# Patient Record
Sex: MALE | Race: WHITE | NOT HISPANIC OR LATINO
[De-identification: names, ages, dates, MRNs, and addresses within clinical notes are randomized per-mention and may not be internally consistent; named-entity substitution may affect disease eponyms.]

---

## 2018-11-24 ENCOUNTER — TRANSCRIPTION ENCOUNTER (OUTPATIENT)
Age: 63
End: 2018-11-24

## 2019-08-22 PROBLEM — Z00.00 ENCOUNTER FOR PREVENTIVE HEALTH EXAMINATION: Status: ACTIVE | Noted: 2019-08-22

## 2019-09-05 ENCOUNTER — OUTPATIENT (OUTPATIENT)
Dept: OUTPATIENT SERVICES | Facility: HOSPITAL | Age: 64
LOS: 1 days | Discharge: HOME | End: 2019-09-05

## 2019-09-05 ENCOUNTER — APPOINTMENT (OUTPATIENT)
Dept: CARDIOLOGY | Facility: CLINIC | Age: 64
End: 2019-09-05
Payer: COMMERCIAL

## 2019-09-05 ENCOUNTER — OTHER (OUTPATIENT)
Age: 64
End: 2019-09-05

## 2019-09-05 VITALS
HEIGHT: 71 IN | SYSTOLIC BLOOD PRESSURE: 120 MMHG | BODY MASS INDEX: 25.76 KG/M2 | DIASTOLIC BLOOD PRESSURE: 86 MMHG | WEIGHT: 184 LBS | HEART RATE: 70 BPM

## 2019-09-05 DIAGNOSIS — R00.2 PALPITATIONS: ICD-10-CM

## 2019-09-05 PROCEDURE — 99204 OFFICE O/P NEW MOD 45 MIN: CPT

## 2019-09-05 PROCEDURE — 93000 ELECTROCARDIOGRAM COMPLETE: CPT
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
## 2019-09-05 NOTE — REASON FOR VISIT
[Consultation] : a consultation regarding [Palpitations] : palpitations [FreeTextEntry1] : presyncope
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
## 2019-09-05 NOTE — PHYSICAL EXAM
[General Appearance - Well Developed] : well developed [Normal Appearance] : normal appearance [Well Groomed] : well groomed [General Appearance - Well Nourished] : well nourished [No Deformities] : no deformities [General Appearance - In No Acute Distress] : no acute distress [Normal Conjunctiva] : the conjunctiva exhibited no abnormalities [Eyelids - No Xanthelasma] : the eyelids demonstrated no xanthelasmas [Normal Oral Mucosa] : normal oral mucosa [No Oral Pallor] : no oral pallor [No Oral Cyanosis] : no oral cyanosis [Normal Rate] : normal [Rhythm Regular] : regular [No Murmur] : no murmurs heard [2+] : left 2+ [Respiration, Rhythm And Depth] : normal respiratory rhythm and effort [Exaggerated Use Of Accessory Muscles For Inspiration] : no accessory muscle use [Auscultation Breath Sounds / Voice Sounds] : lungs were clear to auscultation bilaterally [Abdomen Soft] : soft [Abdomen Tenderness] : non-tender [Abdomen Mass (___ Cm)] : no abdominal mass palpated [Abnormal Walk] : normal gait [Gait - Sufficient For Exercise Testing] : the gait was sufficient for exercise testing [Nail Clubbing] : no clubbing of the fingernails [Cyanosis, Localized] : no localized cyanosis [Petechial Hemorrhages (___cm)] : no petechial hemorrhages [Skin Color & Pigmentation] : normal skin color and pigmentation [] : no rash [No Venous Stasis] : no venous stasis [Skin Lesions] : no skin lesions [No Skin Ulcers] : no skin ulcer [No Xanthoma] : no  xanthoma was observed [Oriented To Time, Place, And Person] : oriented to person, place, and time [Affect] : the affect was normal [Mood] : the mood was normal [No Anxiety] : not feeling anxious [FreeTextEntry1] : No JVD No bruit

## 2019-09-05 NOTE — REVIEW OF SYSTEMS
[Eyeglasses] : currently wearing eyeglasses [Palpitations] : palpitations [Heartburn] : heartburn [Joint Pain] : joint pain [Joint Swelling] : joint swelling [Negative] : Heme/Lymph [Shortness Of Breath] : no shortness of breath [Chest Pain] : no chest pain [Abdominal Pain] : no abdominal pain [Nausea] : no nausea [Dysphagia] : no dysphagia

## 2019-09-08 LAB
T4 FREE SERPL-MCNC: 1.1 NG/DL
TSH SERPL-ACNC: 1.85 UIU/ML

## 2019-10-03 ENCOUNTER — APPOINTMENT (OUTPATIENT)
Dept: CARDIOLOGY | Facility: CLINIC | Age: 64
End: 2019-10-03
Payer: COMMERCIAL

## 2019-10-03 PROCEDURE — ZZZZZ: CPT

## 2019-10-09 ENCOUNTER — APPOINTMENT (OUTPATIENT)
Dept: CARDIOLOGY | Facility: CLINIC | Age: 64
End: 2019-10-09
Payer: COMMERCIAL

## 2019-10-09 VITALS
SYSTOLIC BLOOD PRESSURE: 128 MMHG | WEIGHT: 182 LBS | BODY MASS INDEX: 25.48 KG/M2 | DIASTOLIC BLOOD PRESSURE: 80 MMHG | HEIGHT: 71 IN | HEART RATE: 71 BPM

## 2019-10-09 DIAGNOSIS — Z78.9 OTHER SPECIFIED HEALTH STATUS: ICD-10-CM

## 2019-10-09 DIAGNOSIS — Z72.89 OTHER PROBLEMS RELATED TO LIFESTYLE: ICD-10-CM

## 2019-10-09 PROCEDURE — 99214 OFFICE O/P EST MOD 30 MIN: CPT

## 2019-10-09 PROCEDURE — 93000 ELECTROCARDIOGRAM COMPLETE: CPT

## 2019-10-09 NOTE — REASON FOR VISIT
[Atrial Fibrillation] : atrial fibrillation [Initial Evaluation] : an initial evaluation of [FreeTextEntry1] : Referring cardiologist: Dr. Mix \par  [Spouse] : spouse

## 2019-10-09 NOTE — HISTORY OF PRESENT ILLNESS
[FreeTextEntry1] : 64 yo M former  with recurrent palpitations and newly dx Afib on the cardiac telemetry monitor. He presents for initial consultation. \par \par Patient reports palpitations since Spring 2019. Since then he has had 3 more episodes of "fluttering" at times associated with near syncope symptoms lasting 15-20 minutes . He was seen by Dr. Mix, he was equipped with an event monitor , which recorded an episode of Paroxysmal Afib with RVR  , -160's on 10/6/2019 at 20:11 pm.  \par \par ECG ( 10/9/2019) - SR at 71 bpm, LAD, QTc 391 ms \par \par Social hx: non smoker, drinks 1-2 glasses /wine daily, no elicit drug use .

## 2019-10-09 NOTE — PHYSICAL EXAM
[General Appearance - Well Nourished] : well nourished [No Deformities] : no deformities [General Appearance - Well Developed] : well developed [Normal Conjunctiva] : the conjunctiva exhibited no abnormalities [Normal Oropharynx] : normal oropharynx [Normal Oral Mucosa] : normal oral mucosa [Heart Rate And Rhythm] : heart rate and rhythm were normal [Heart Sounds] : normal S1 and S2 [Respiration, Rhythm And Depth] : normal respiratory rhythm and effort [Auscultation Breath Sounds / Voice Sounds] : lungs were clear to auscultation bilaterally [Bowel Sounds] : normal bowel sounds [Abdomen Soft] : soft [Abnormal Walk] : normal gait [Nail Clubbing] : no clubbing of the fingernails [Cyanosis, Localized] : no localized cyanosis [Petechial Hemorrhages (___cm)] : no petechial hemorrhages [Nail Splinter Hemorrhages] : no splinter hemorrhages of the nails [Skin Color & Pigmentation] : normal skin color and pigmentation [] : no rash [Oriented To Time, Place, And Person] : oriented to person, place, and time [No Anxiety] : not feeling anxious [Normal Appearance] : normal appearance [Well Groomed] : well groomed [General Appearance - In No Acute Distress] : no acute distress [FreeTextEntry1] : No JVD [Exaggerated Use Of Accessory Muscles For Inspiration] : no accessory muscle use [Murmurs] : no murmurs present [Edema] : no peripheral edema present

## 2019-10-09 NOTE — END OF VISIT
[FreeTextEntry3] : I was present with the nurse practitioner during the history and exam of the patient. I discussed patient's management with the NP in detail. I reviewed the NP’s note and agree with the documented findings and plan of care. I would like to take this opportunity to thank you for involving me in this patient’s care. Please do not hesitate to contact me if you have any further questions at [phone].

## 2019-10-09 NOTE — ASSESSMENT
[FreeTextEntry1] : 64 y/o male with history of palpitations and newly diagnosed Symptomatic Paroxysmal Atrial Fibrillation noted on event monitor with CHADS - VASc is 0 . \par \par Patient was seen and examined with Dr. He:  Atrial Fibrillation management, stroke prevention,  rate control and rhythm control and  medical management with AADs therapy, vs AV femi blocking agents vs a catheter ablation options  discussed  in details with the patient and his wife. \par \par At this time we recommend start Metoprolol ER 25 mg daily for rate control.  I have educated the patient that BB may also lower their blood pressure and that they should monitor their BP at home and to call us and hold the medication if SBP is less than 90 mmHg. I have also informed the patient that this medication can cause fatigue, dizziness or lightheadedness and to watch for these symptoms.\par \par  His CHADS- VASc is 0 . Will add ASA 81 mg daily to his medication regimen . We advised to proceed with a Stress ECHO as scheduled on 11/6/2019 and to continue the event monitor, that he is wearing now for total of 4 weeks to eval AFib burden and rate control. We also referred him to pulmonary to r/o MARZENA . Recent TSH was normal. \par \par He will return for re-assessment in 4-6 weeks . He knows to contact the office if recurrent palpitations, or if he develops side effects from Metoprolol .  \par \par He verbalized full understanding of the discussion and all questions were answered.

## 2019-11-01 ENCOUNTER — APPOINTMENT (OUTPATIENT)
Dept: CARDIOLOGY | Facility: CLINIC | Age: 64
End: 2019-11-01
Payer: COMMERCIAL

## 2019-11-01 PROCEDURE — 93228 REMOTE 30 DAY ECG REV/REPORT: CPT

## 2019-11-06 ENCOUNTER — APPOINTMENT (OUTPATIENT)
Dept: CARDIOLOGY | Facility: CLINIC | Age: 64
End: 2019-11-06
Payer: COMMERCIAL

## 2019-11-06 PROCEDURE — 93351 STRESS TTE COMPLETE: CPT

## 2019-11-06 PROCEDURE — 93325 DOPPLER ECHO COLOR FLOW MAPG: CPT

## 2019-11-06 PROCEDURE — 93320 DOPPLER ECHO COMPLETE: CPT

## 2019-11-13 ENCOUNTER — APPOINTMENT (OUTPATIENT)
Dept: CARDIOLOGY | Facility: CLINIC | Age: 64
End: 2019-11-13
Payer: COMMERCIAL

## 2019-11-13 VITALS
SYSTOLIC BLOOD PRESSURE: 120 MMHG | DIASTOLIC BLOOD PRESSURE: 75 MMHG | HEART RATE: 60 BPM | HEIGHT: 71 IN | BODY MASS INDEX: 25.48 KG/M2 | WEIGHT: 182 LBS

## 2019-11-13 DIAGNOSIS — Z87.19 PERSONAL HISTORY OF OTHER DISEASES OF THE DIGESTIVE SYSTEM: ICD-10-CM

## 2019-11-13 PROCEDURE — 99215 OFFICE O/P EST HI 40 MIN: CPT

## 2019-11-13 PROCEDURE — 93000 ELECTROCARDIOGRAM COMPLETE: CPT

## 2019-11-13 NOTE — CARDIOLOGY SUMMARY
[___] : [unfilled] [No Ischemia] : no Ischemia [No Exercise Ind Arr] : no exercise induced arrhythmias [___] : [unfilled]

## 2019-11-13 NOTE — PHYSICAL EXAM
[General Appearance - Well Developed] : well developed [Normal Appearance] : normal appearance [Well Groomed] : well groomed [General Appearance - Well Nourished] : well nourished [No Deformities] : no deformities [General Appearance - In No Acute Distress] : no acute distress [Normal Conjunctiva] : the conjunctiva exhibited no abnormalities [Normal Oral Mucosa] : normal oral mucosa [Normal Oropharynx] : normal oropharynx [Respiration, Rhythm And Depth] : normal respiratory rhythm and effort [Exaggerated Use Of Accessory Muscles For Inspiration] : no accessory muscle use [Auscultation Breath Sounds / Voice Sounds] : lungs were clear to auscultation bilaterally [Heart Rate And Rhythm] : heart rate and rhythm were normal [Murmurs] : no murmurs present [Heart Sounds] : normal S1 and S2 [Edema] : no peripheral edema present [Bowel Sounds] : normal bowel sounds [Abdomen Soft] : soft [Abnormal Walk] : normal gait [Nail Clubbing] : no clubbing of the fingernails [Cyanosis, Localized] : no localized cyanosis [Petechial Hemorrhages (___cm)] : no petechial hemorrhages [Nail Splinter Hemorrhages] : no splinter hemorrhages of the nails [Skin Color & Pigmentation] : normal skin color and pigmentation [] : no rash [Oriented To Time, Place, And Person] : oriented to person, place, and time [No Anxiety] : not feeling anxious [FreeTextEntry1] : No JVD

## 2019-11-13 NOTE — REASON FOR VISIT
[Initial Evaluation] : an initial evaluation of [Atrial Fibrillation] : atrial fibrillation [Spouse] : spouse [FreeTextEntry1] : \par

## 2019-11-13 NOTE — HISTORY OF PRESENT ILLNESS
[FreeTextEntry1] : \par Referring cardiologist: Dr. Mix \par \par 64 yo M former  with recurrent palpitations and newly dx Afib on the cardiac telemetry monitor. He presents for follow up after was started with metoprolol\par \par Patient reports palpitations since Spring 2019. Since then he has had 3 more episodes of "fluttering" at times associated with near syncope symptoms lasting 15-20 minutes . He was seen by Dr. Mix, he was equipped with an event monitor , which recorded an episode of Paroxysmal Afib with RVR  , -160's on 10/6/2019 at 20:11 pm.  He feels better since being on BB. He still notes occasional but very brief palpitations. \par

## 2019-11-13 NOTE — END OF VISIT
[FreeTextEntry3] : I was present with the nurse practitioner during the history and exam of the patient. I discussed patient's management with the NP in detail. I reviewed the NP’s note and agree with the documented findings and plan of care. I would like to take this opportunity to thank you for involving me in this patient’s care. Please do not hesitate to contact me if you have any further questions at [phone].\par

## 2019-11-14 ENCOUNTER — OUTPATIENT (OUTPATIENT)
Dept: OUTPATIENT SERVICES | Facility: HOSPITAL | Age: 64
LOS: 1 days | Discharge: HOME | End: 2019-11-14

## 2019-11-15 DIAGNOSIS — G47.33 OBSTRUCTIVE SLEEP APNEA (ADULT) (PEDIATRIC): ICD-10-CM

## 2019-11-27 ENCOUNTER — APPOINTMENT (OUTPATIENT)
Dept: CARDIOLOGY | Facility: CLINIC | Age: 64
End: 2019-11-27
Payer: COMMERCIAL

## 2019-11-27 VITALS
HEART RATE: 60 BPM | HEIGHT: 71 IN | BODY MASS INDEX: 26.04 KG/M2 | DIASTOLIC BLOOD PRESSURE: 80 MMHG | SYSTOLIC BLOOD PRESSURE: 118 MMHG | WEIGHT: 186 LBS

## 2019-11-27 PROCEDURE — 93000 ELECTROCARDIOGRAM COMPLETE: CPT

## 2019-11-27 PROCEDURE — 99214 OFFICE O/P EST MOD 30 MIN: CPT

## 2019-11-27 NOTE — PHYSICAL EXAM
[General Appearance - Well Developed] : well developed [Normal Appearance] : normal appearance [Well Groomed] : well groomed [General Appearance - Well Nourished] : well nourished [No Deformities] : no deformities [General Appearance - In No Acute Distress] : no acute distress [Normal Conjunctiva] : the conjunctiva exhibited no abnormalities [Eyelids - No Xanthelasma] : the eyelids demonstrated no xanthelasmas [Normal Oral Mucosa] : normal oral mucosa [No Oral Pallor] : no oral pallor [No Oral Cyanosis] : no oral cyanosis [Respiration, Rhythm And Depth] : normal respiratory rhythm and effort [Exaggerated Use Of Accessory Muscles For Inspiration] : no accessory muscle use [Auscultation Breath Sounds / Voice Sounds] : lungs were clear to auscultation bilaterally [Abdomen Soft] : soft [Abdomen Tenderness] : non-tender [Abdomen Mass (___ Cm)] : no abdominal mass palpated [Abnormal Walk] : normal gait [Gait - Sufficient For Exercise Testing] : the gait was sufficient for exercise testing [Nail Clubbing] : no clubbing of the fingernails [Cyanosis, Localized] : no localized cyanosis [Petechial Hemorrhages (___cm)] : no petechial hemorrhages [Skin Color & Pigmentation] : normal skin color and pigmentation [] : no rash [No Venous Stasis] : no venous stasis [Skin Lesions] : no skin lesions [No Skin Ulcers] : no skin ulcer [No Xanthoma] : no  xanthoma was observed [Oriented To Time, Place, And Person] : oriented to person, place, and time [Affect] : the affect was normal [Mood] : the mood was normal [No Anxiety] : not feeling anxious [Normal Rate] : normal [Rhythm Regular] : regular [No Murmur] : no murmurs heard [2+] : left 2+ [FreeTextEntry1] : No JVD No bruit

## 2019-11-27 NOTE — ASSESSMENT
[FreeTextEntry1] : PAF, episodes are shorter and less symptomatic with beta blocker. He is CHADS vasc is 0 and he is on ASA . ETT echo was negative for ischemia at moderate exercise load .

## 2019-11-27 NOTE — HISTORY OF PRESENT ILLNESS
[FreeTextEntry1] : The patient was found to have PAF with RVR . The patient was also found to have severe MARZENA . The patient is thought to be CHADSvasc 0 and is treated with ASA and beta blocker. This has improved his symptoms . The patient's last episode was tow weeks ago and was brief  . The patient has not had chest pain . ETT echo was negative for ischemia .

## 2020-01-07 ENCOUNTER — RX RENEWAL (OUTPATIENT)
Age: 65
End: 2020-01-07

## 2020-03-10 ENCOUNTER — APPOINTMENT (OUTPATIENT)
Dept: UROLOGY | Facility: CLINIC | Age: 65
End: 2020-03-10
Payer: MEDICARE

## 2020-03-10 VITALS — BODY MASS INDEX: 25.9 KG/M2 | HEIGHT: 71 IN | WEIGHT: 185 LBS

## 2020-03-10 DIAGNOSIS — N40.1 BENIGN PROSTATIC HYPERPLASIA WITH LOWER URINARY TRACT SYMPMS: ICD-10-CM

## 2020-03-10 DIAGNOSIS — N13.8 BENIGN PROSTATIC HYPERPLASIA WITH LOWER URINARY TRACT SYMPMS: ICD-10-CM

## 2020-03-10 PROCEDURE — 99203 OFFICE O/P NEW LOW 30 MIN: CPT

## 2020-03-10 NOTE — PHYSICAL EXAM
[General Appearance - In No Acute Distress] : no acute distress [Edema] : no peripheral edema [] : no respiratory distress [Bowel Sounds] : normal bowel sounds [Abdomen Hernia] : no hernia was discovered [Urethral Meatus] : meatus normal [Scrotum] : the scrotum was normal [Epididymis] : the epididymides were normal [Testes Tenderness] : no tenderness of the testes [Testes Mass (___cm)] : there were no testicular masses [Prostate Tenderness] : the prostate was not tender [No Prostate Nodules] : no prostate nodules [Prostate Size ___ (0-4)] : prostate size [unfilled] (scale: 0-4) [FreeTextEntry1] : Minimal dorsal Peyronie's plaque

## 2020-03-10 NOTE — REVIEW OF SYSTEMS
[Fever] : no fever [Feeling Poorly] : not feeling poorly [Chest Pain] : no chest pain [Cough] : no cough [Abdominal Pain] : no abdominal pain [Dysuria] : no dysuria [Confused] : no confusion

## 2020-03-10 NOTE — ASSESSMENT
[FreeTextEntry1] : Likely resolved right inguinal/scrotal soft tissue injury.\par \par BPH with urinary symptoms requires no treatment\par \par Early Peyronie's disease. Recommend vitamin E 400 international units 3 times a day [Peyronie's Disease (607.85\N48.6)] : complete
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
## 2020-03-27 ENCOUNTER — APPOINTMENT (OUTPATIENT)
Dept: CARDIOLOGY | Facility: CLINIC | Age: 65
End: 2020-03-27

## 2020-03-30 ENCOUNTER — RX RENEWAL (OUTPATIENT)
Age: 65
End: 2020-03-30

## 2020-04-15 ENCOUNTER — APPOINTMENT (OUTPATIENT)
Dept: CARDIOLOGY | Facility: CLINIC | Age: 65
End: 2020-04-15
Payer: MEDICARE

## 2020-04-15 VITALS — BODY MASS INDEX: 25.9 KG/M2 | HEIGHT: 71 IN | WEIGHT: 185 LBS

## 2020-04-15 DIAGNOSIS — N40.0 BENIGN PROSTATIC HYPERPLASIA WITHOUT LOWER URINARY TRACT SYMPMS: ICD-10-CM

## 2020-04-15 PROCEDURE — 99214 OFFICE O/P EST MOD 30 MIN: CPT | Mod: 95

## 2020-04-15 NOTE — PHYSICAL EXAM
[General Appearance - Well Developed] : well developed [Normal Appearance] : normal appearance [Well Groomed] : well groomed [General Appearance - Well Nourished] : well nourished [General Appearance - In No Acute Distress] : no acute distress [Normal Conjunctiva] : the conjunctiva exhibited no abnormalities [Eyelids - No Xanthelasma] : the eyelids demonstrated no xanthelasmas [Normal Oral Mucosa] : normal oral mucosa [No Oral Pallor] : no oral pallor [No Oral Cyanosis] : no oral cyanosis [Respiration, Rhythm And Depth] : normal respiratory rhythm and effort [Exaggerated Use Of Accessory Muscles For Inspiration] : no accessory muscle use [Abdomen Mass (___ Cm)] : no abdominal mass palpated [Abnormal Walk] : normal gait [Nail Clubbing] : no clubbing of the fingernails [Cyanosis, Localized] : no localized cyanosis [Petechial Hemorrhages (___cm)] : no petechial hemorrhages [Skin Color & Pigmentation] : normal skin color and pigmentation [] : no rash [No Venous Stasis] : no venous stasis [Skin Lesions] : no skin lesions [No Skin Ulcers] : no skin ulcer [No Xanthoma] : no  xanthoma was observed [Oriented To Time, Place, And Person] : oriented to person, place, and time [Affect] : the affect was normal [Mood] : the mood was normal [No Anxiety] : not feeling anxious [No Murmur] : no murmurs heard [2+] : left 2+ [No Pitting Edema] : no pitting edema present [Rt] : varicose veins of the right leg noted [Lt] : varicose veins of the left leg noted [FreeTextEntry1] : No JVD

## 2020-04-15 NOTE — HISTORY OF PRESENT ILLNESS
[Home] : at home, [unfilled] , at the time of the visit. [Other Location: e.g. Home (Enter Location, City,State)___] : at [unfilled] [Patient] : the patient [FreeTextEntry1] : The patient has not had clinical AF . Occasional fleeting extrasystole . Using CPAP but not able to make it through the night with his CPAP macine. . He does not have HTN or DM and is now CHADSvasc 1  in view of age .

## 2020-04-15 NOTE — ASSESSMENT
[FreeTextEntry1] : The patient has had no further episodes of AF . He has MARZENA which may be driving his symptoms , He is using CPAP but is having difficulty using it all night , He is know CHADSvasc 1 because of age .

## 2020-04-16 ENCOUNTER — TRANSCRIPTION ENCOUNTER (OUTPATIENT)
Age: 65
End: 2020-04-16

## 2020-05-19 VITALS — BODY MASS INDEX: 25.2 KG/M2 | HEIGHT: 71 IN | WEIGHT: 180 LBS

## 2020-05-19 RX ORDER — ASPIRIN 325 MG
81 TABLET ORAL
Qty: 90 | Refills: 1 | Status: COMPLETED | COMMUNITY
Start: 2020-03-30 | End: 2020-05-19

## 2020-05-26 ENCOUNTER — APPOINTMENT (OUTPATIENT)
Dept: CARDIOLOGY | Facility: CLINIC | Age: 65
End: 2020-05-26
Payer: MEDICARE

## 2020-05-26 VITALS
WEIGHT: 190 LBS | HEIGHT: 71 IN | HEART RATE: 54 BPM | SYSTOLIC BLOOD PRESSURE: 120 MMHG | BODY MASS INDEX: 26.6 KG/M2 | DIASTOLIC BLOOD PRESSURE: 80 MMHG

## 2020-05-26 PROCEDURE — 99213 OFFICE O/P EST LOW 20 MIN: CPT | Mod: 95

## 2020-05-26 NOTE — PHYSICAL EXAM
[Normal Appearance] : normal appearance [General Appearance - Well Developed] : well developed [General Appearance - In No Acute Distress] : no acute distress [Exaggerated Use Of Accessory Muscles For Inspiration] : no accessory muscle use [] : no respiratory distress [Oriented To Time, Place, And Person] : oriented to person, place, and time [No Anxiety] : not feeling anxious [FreeTextEntry1] : wear glasses

## 2020-05-26 NOTE — ASSESSMENT
[FreeTextEntry1] : 63 yo M with history of palpitations and symptomatic Paroxysmal Atrial Fibrillation noted on event monitor with CHADS - VASc 1 (Age) on aspirin. He has newly diagnosed MARZENA and is compliant with CPAP.\par \par In the past, we offered increasing his Metoprolol to 50 mg daily given one other episode of AFib with RVR, but pt states his symptoms have substantially improved and he would rather not try any increased medication doses or new medications at this time. Should his symptoms become more frequent, we can consider adding AAD or an ablation. \par \par \par He will return for re-assessment in 6 months. He knows to contact the office if recurrent palpitations. I have also advised the patient to go to the nearest emergency room if he experiences any chest pain, dyspnea, syncope, or has any other compelling symptoms.\par \par He verbalized full understanding of the discussion and all questions were answered.

## 2020-05-26 NOTE — HISTORY OF PRESENT ILLNESS
[FreeTextEntry1] : Mr. Jr Kaplan has given me verbal authorization to provide tele services. Verbal consent given on 05/26/2020  by the patient.\par \par This visit was provided via telehealth using real-time 2-way audio visual technology. The patient, Mr. Jr Kaplan, was located at home,  [address] [place] [address], at the time of the visit. \par \par The patient, Mr. Jr Kaplan, and Provider participated in the telehealth encounter.\par \par Referring cardiologist: Dr. Mix \par \par 63 yo M former  with recurrent palpitations and newly dx Afib on the cardiac telemetry monitor. He presents for routine follow up. He initially had palpitations since Spring 2019. Since then he had 3 more episodes of "fluttering" at times associated with near syncope symptoms lasting 15-20 minutes. He was seen by Dr. Mix, he was equipped with an event monitor, which recorded an episode of Paroxysmal AFib with RVR (-160's) on 10/6/2019 at 20:11 pm. He feels better since being on BB. He still notes occasional but very brief palpitations, now lasting only a few seconds. He feels better overall. \par \par He was also diagnosed with sleep apnea and started using CPAP. He feels this has helped decrease his palpitations.

## 2020-09-10 ENCOUNTER — APPOINTMENT (OUTPATIENT)
Dept: CARDIOLOGY | Facility: CLINIC | Age: 65
End: 2020-09-10
Payer: MEDICARE

## 2020-09-10 VITALS — WEIGHT: 186 LBS | HEIGHT: 71 IN | BODY MASS INDEX: 26.04 KG/M2

## 2020-09-10 VITALS — DIASTOLIC BLOOD PRESSURE: 80 MMHG | SYSTOLIC BLOOD PRESSURE: 110 MMHG | HEART RATE: 60 BPM

## 2020-09-10 PROCEDURE — 99214 OFFICE O/P EST MOD 30 MIN: CPT | Mod: 95

## 2020-09-10 NOTE — REVIEW OF SYSTEMS
[Eyeglasses] : currently wearing eyeglasses [Palpitations] : palpitations [Heartburn] : heartburn [Joint Swelling] : joint swelling [Joint Pain] : joint pain [Negative] : Heme/Lymph [Chest Pain] : no chest pain [Shortness Of Breath] : no shortness of breath [Dysphagia] : no dysphagia [Abdominal Pain] : no abdominal pain [Nausea] : no nausea

## 2020-09-10 NOTE — HISTORY OF PRESENT ILLNESS
[Home] : at home, [unfilled] , at the time of the visit. [Medical Office: ([facility])___] : at the medical office located in  [Patient] : the patient [FreeTextEntry1] : The patient has not had clinical AF . Occasional fleeting extrasystole . Using CPAP but not able to make it through the night with his CPAP macine. . He does not have HTN or DM and is now CHADSvasc 1  in view of age .

## 2020-09-10 NOTE — PHYSICAL EXAM
[Normal Appearance] : normal appearance [General Appearance - Well Developed] : well developed [General Appearance - Well Nourished] : well nourished [General Appearance - In No Acute Distress] : no acute distress [Normal Conjunctiva] : the conjunctiva exhibited no abnormalities [Well Groomed] : well groomed [Normal Oral Mucosa] : normal oral mucosa [Eyelids - No Xanthelasma] : the eyelids demonstrated no xanthelasmas [No Oral Pallor] : no oral pallor [No Oral Cyanosis] : no oral cyanosis [Respiration, Rhythm And Depth] : normal respiratory rhythm and effort [Abdomen Mass (___ Cm)] : no abdominal mass palpated [Exaggerated Use Of Accessory Muscles For Inspiration] : no accessory muscle use [Abnormal Walk] : normal gait [Cyanosis, Localized] : no localized cyanosis [Petechial Hemorrhages (___cm)] : no petechial hemorrhages [Nail Clubbing] : no clubbing of the fingernails [Skin Color & Pigmentation] : normal skin color and pigmentation [] : no rash [No Venous Stasis] : no venous stasis [Skin Lesions] : no skin lesions [No Skin Ulcers] : no skin ulcer [Oriented To Time, Place, And Person] : oriented to person, place, and time [No Xanthoma] : no  xanthoma was observed [Mood] : the mood was normal [No Anxiety] : not feeling anxious [Affect] : the affect was normal [No Murmur] : no murmurs heard [Rt] : varicose veins of the right leg noted [Lt] : varicose veins of the left leg noted [No Pitting Edema] : no pitting edema present [2+] : left 2+ [FreeTextEntry1] : No JVD

## 2020-09-10 NOTE — ASSESSMENT
[FreeTextEntry1] : The patent has had no further AF clinically . He is CHADSvasc 1 and is being treated with ASA . No chest pain or SOB . No syncope or presyncope .

## 2020-09-16 ENCOUNTER — APPOINTMENT (OUTPATIENT)
Dept: CARDIOLOGY | Facility: CLINIC | Age: 65
End: 2020-09-16

## 2020-09-29 ENCOUNTER — RX RENEWAL (OUTPATIENT)
Age: 65
End: 2020-09-29

## 2020-12-02 ENCOUNTER — APPOINTMENT (OUTPATIENT)
Dept: CARDIOLOGY | Facility: CLINIC | Age: 65
End: 2020-12-02
Payer: SELF-PAY

## 2020-12-02 VITALS
WEIGHT: 186 LBS | TEMPERATURE: 97.6 F | SYSTOLIC BLOOD PRESSURE: 138 MMHG | HEART RATE: 67 BPM | HEIGHT: 71 IN | DIASTOLIC BLOOD PRESSURE: 82 MMHG | BODY MASS INDEX: 26.04 KG/M2

## 2020-12-02 PROCEDURE — 99072 ADDL SUPL MATRL&STAF TM PHE: CPT

## 2020-12-02 PROCEDURE — 93000 ELECTROCARDIOGRAM COMPLETE: CPT

## 2020-12-02 PROCEDURE — 99214 OFFICE O/P EST MOD 30 MIN: CPT

## 2020-12-02 NOTE — HISTORY OF PRESENT ILLNESS
[FreeTextEntry1] : \par Referring cardiologist: Dr. Mix \par \par 65 yo M former  with recurrent palpitations and newly diagnosed AFib on the cardiac telemetry monitor. He presents for routine follow up. He initially had palpitations since Spring 2019. Since then he had 3 more episodes of "fluttering" at times associated with near syncope symptoms lasting 15-20 minutes. He was seen by Dr. Mix, he was equipped with an event monitor, which recorded an episode of Paroxysmal AFib with RVR (-160's) on 10/6/2019 at 20:11 pm. He feels better since being on BB. He notes very brief palpitations. They are so rare that he states "can't even remember the last one." He feels better overall. \par \par He was also diagnosed with sleep apnea and uses his CPAP every night.

## 2020-12-02 NOTE — ASSESSMENT
[FreeTextEntry1] : 65 yo M with history of palpitations and symptomatic paroxysmal Atrial Fibrillation noted on event monitor with CHADS - VASc 1 (Age) on aspirin. \par \par Paroxsymal AFib \par - On Aspirin for CHADS VASc 1 with no s/sx of bleeding.\par - Currently doing well and has less palpitations on Metoprolol Succinate 25mg. \par - Will consider ablation if symptoms worsen\par \par MARZENA\par - Compliant with CPAP\par \par Follow up in 9 months or sooner if sx occur. \par \par I have also advised the patient to go to the nearest emergency room if he experiences any chest pain, dyspnea, syncope, or has any other compelling symptoms.

## 2020-12-02 NOTE — PHYSICAL EXAM
[General Appearance - Well Developed] : well developed [Normal Appearance] : normal appearance [Well Groomed] : well groomed [General Appearance - Well Nourished] : well nourished [No Deformities] : no deformities [General Appearance - In No Acute Distress] : no acute distress [Normal Conjunctiva] : the conjunctiva exhibited no abnormalities [Eyelids - No Xanthelasma] : the eyelids demonstrated no xanthelasmas [] : no respiratory distress [Respiration, Rhythm And Depth] : normal respiratory rhythm and effort [Exaggerated Use Of Accessory Muscles For Inspiration] : no accessory muscle use [Auscultation Breath Sounds / Voice Sounds] : lungs were clear to auscultation bilaterally [Heart Rate And Rhythm] : heart rate and rhythm were normal [Heart Sounds] : normal S1 and S2 [Murmurs] : no murmurs present [Edema] : no peripheral edema present [Abdomen Soft] : soft [Abnormal Walk] : normal gait [Nail Clubbing] : no clubbing of the fingernails [Cyanosis, Localized] : no localized cyanosis [Skin Color & Pigmentation] : normal skin color and pigmentation [Oriented To Time, Place, And Person] : oriented to person, place, and time [FreeTextEntry1] : No JVD

## 2021-01-27 ENCOUNTER — APPOINTMENT (OUTPATIENT)
Dept: CARDIOLOGY | Facility: CLINIC | Age: 66
End: 2021-01-27
Payer: MEDICARE

## 2021-01-27 VITALS
DIASTOLIC BLOOD PRESSURE: 70 MMHG | WEIGHT: 190 LBS | TEMPERATURE: 97.4 F | HEIGHT: 71 IN | BODY MASS INDEX: 26.6 KG/M2 | HEART RATE: 65 BPM | SYSTOLIC BLOOD PRESSURE: 120 MMHG

## 2021-01-27 PROCEDURE — 99072 ADDL SUPL MATRL&STAF TM PHE: CPT

## 2021-01-27 PROCEDURE — 93000 ELECTROCARDIOGRAM COMPLETE: CPT

## 2021-01-27 PROCEDURE — 99214 OFFICE O/P EST MOD 30 MIN: CPT

## 2021-01-27 NOTE — PHYSICAL EXAM
[General Appearance - Well Developed] : well developed [Normal Appearance] : normal appearance [Well Groomed] : well groomed [General Appearance - Well Nourished] : well nourished [General Appearance - In No Acute Distress] : no acute distress [Normal Conjunctiva] : the conjunctiva exhibited no abnormalities [Eyelids - No Xanthelasma] : the eyelids demonstrated no xanthelasmas [No Oral Pallor] : no oral pallor [Normal Oral Mucosa] : normal oral mucosa [No Oral Cyanosis] : no oral cyanosis [Respiration, Rhythm And Depth] : normal respiratory rhythm and effort [Exaggerated Use Of Accessory Muscles For Inspiration] : no accessory muscle use [Abdomen Mass (___ Cm)] : no abdominal mass palpated [Abnormal Walk] : normal gait [Nail Clubbing] : no clubbing of the fingernails [Cyanosis, Localized] : no localized cyanosis [Petechial Hemorrhages (___cm)] : no petechial hemorrhages [Skin Color & Pigmentation] : normal skin color and pigmentation [] : no rash [No Venous Stasis] : no venous stasis [Skin Lesions] : no skin lesions [No Skin Ulcers] : no skin ulcer [No Xanthoma] : no  xanthoma was observed [Oriented To Time, Place, And Person] : oriented to person, place, and time [Affect] : the affect was normal [Mood] : the mood was normal [No Anxiety] : not feeling anxious [No Murmur] : no murmurs heard [2+] : left 2+ [No Pitting Edema] : no pitting edema present [Rt] : varicose veins of the right leg noted [Lt] : varicose veins of the left leg noted [FreeTextEntry1] : No JVD

## 2021-01-27 NOTE — HISTORY OF PRESENT ILLNESS
[FreeTextEntry1] : The patient has been feeling feeling well. He has not had palpitations except for occasional fleeting ep[isode more c/w extrasystole . He is CHADsvasc 1 and is onAS .

## 2021-01-27 NOTE — ASSESSMENT
[FreeTextEntry1] : The patent has had no further AF clinically . He is CHADSvasc 1 and is being treated with ASA . No chest pain or SOB . No syncope or presyncope .  The patient has had 2 CAC scores done . In 2008 he had a CAC score of 0 . In 2014 his CAC score was about 8 . LDL is not at goal.

## 2021-02-04 ENCOUNTER — APPOINTMENT (OUTPATIENT)
Dept: CARDIOLOGY | Facility: CLINIC | Age: 66
End: 2021-02-04

## 2021-05-04 ENCOUNTER — RX RENEWAL (OUTPATIENT)
Age: 66
End: 2021-05-04

## 2021-07-22 ENCOUNTER — APPOINTMENT (OUTPATIENT)
Dept: CARDIOLOGY | Facility: CLINIC | Age: 66
End: 2021-07-22
Payer: MEDICARE

## 2021-07-22 VITALS
BODY MASS INDEX: 25.76 KG/M2 | DIASTOLIC BLOOD PRESSURE: 82 MMHG | HEART RATE: 64 BPM | HEIGHT: 71 IN | TEMPERATURE: 96.7 F | SYSTOLIC BLOOD PRESSURE: 122 MMHG | WEIGHT: 184 LBS

## 2021-07-22 PROCEDURE — 93000 ELECTROCARDIOGRAM COMPLETE: CPT

## 2021-07-22 PROCEDURE — 99214 OFFICE O/P EST MOD 30 MIN: CPT

## 2021-07-22 NOTE — HISTORY OF PRESENT ILLNESS
[FreeTextEntry1] : The patient continues to feels well. No chest pain or SOB using his CPAP machine and is feeling well. The patient has not had chest pain or SOB

## 2021-07-22 NOTE — REASON FOR VISIT
[CV Risk Factors and Non-Cardiac Disease] : CV risk factors and non-cardiac disease [Arrhythmia/ECG Abnorrmalities] : arrhythmia/ECG abnormalities [Hyperlipidemia] : hyperlipidemia [Consultation] : a consultation regarding [Palpitations] : palpitations [FreeTextEntry3] : Dr. Aaron  [FreeTextEntry1] : presyncope

## 2021-07-22 NOTE — ASSESSMENT
[FreeTextEntry1] : The patent has had no further AF clinically . He is CHADSvasc 1 and is being treated with ASA . No chest pain or SOB . No syncope or presyncope .  The patient has had 2 CAC scores done . In 2008 he had a CAC score of 0 . In 2014 his CAC score was about 8 . LDL is now at goal. . For some reason his creatinine had increased from 4-2021 . The patient has been on Omeprazole for his Gupta's. Will repeat BMP

## 2021-08-09 ENCOUNTER — TRANSCRIPTION ENCOUNTER (OUTPATIENT)
Age: 66
End: 2021-08-09

## 2021-09-15 ENCOUNTER — APPOINTMENT (OUTPATIENT)
Dept: CARDIOLOGY | Facility: CLINIC | Age: 66
End: 2021-09-15
Payer: MEDICARE

## 2021-09-15 VITALS
BODY MASS INDEX: 25.9 KG/M2 | TEMPERATURE: 97.6 F | WEIGHT: 185 LBS | DIASTOLIC BLOOD PRESSURE: 85 MMHG | HEART RATE: 60 BPM | SYSTOLIC BLOOD PRESSURE: 127 MMHG | HEIGHT: 71 IN

## 2021-09-15 PROCEDURE — 93000 ELECTROCARDIOGRAM COMPLETE: CPT

## 2021-09-15 PROCEDURE — 99214 OFFICE O/P EST MOD 30 MIN: CPT

## 2021-09-15 NOTE — CARDIOLOGY SUMMARY
[___] : [unfilled] [de-identified] : 9/15/21 NSR (HR 60 bpm) [de-identified] : Stress ECHO 11/6/19, no Ischemia, no exercise induced arrhythmias, Luciano, 7:31, 89% MPHR; EF 55-65%.

## 2021-09-15 NOTE — ASSESSMENT
[FreeTextEntry1] : 66 yo M with history of palpitations and symptomatic paroxysmal Atrial Fibrillation noted on event monitor with CHADS - VASc 1 (Age) on aspirin. \par \par # Paroxysmal AFib \par - On Aspirin for CHADS VASc 1 (Age > 65) with no s/sx of bleeding. Hemoglobin stable. Labs from July reviewed. Cr 1.46\par - Currently doing well and has less palpitations on Metoprolol Succinate 25 mg. \par - Will consider ablation if symptoms worsen. We discussed ablation and AAD but pt would like to defer both for now. \par \par # MARZENA\par - Compliant with CPAP\par - Pulm referral to follow up re CPAP settings\par \par Follow up in 6-9 months or sooner if sx occur. \par \par I have also advised the patient to go to the nearest emergency room if he experiences any chest pain, dyspnea, syncope, or has any other compelling symptoms.

## 2021-09-15 NOTE — DISCUSSION/SUMMARY
[FreeTextEntry1] : We had an extensive conversation regarding the nature of atrial fibrillation, including potential etiologies, underlying pathophysiology and natural history of the disease. In addition, the potential risk of thromboembolic events and assessment of that risk were discussed. I have emphasized the importance of continuing anticoagulation. \par \par In addition, the maintenance of sinus rhythm along with adjuvant antiarrhythmic agents and catheter ablation therapy were discussed. The rationale for and risks of ablation therapy were discussed, including but not limited to bleeding, vascular injury, groin complications, cardiac perforation and tamponade, stroke, esophageal injury, pulmonary vein stenosis, need for pacemaker, need for cardiac surgery, and death. In addition, the long-term and ongoing nature of this therapy were also discussed, including the critical role of continued monitoring post-ablation and the potential for the necessity of repeat ablation procedures to definitively treat the condition. \par \par The patient verbalized understanding of the discussion and all questions were addressed and answered. The patient would like to defer ablation at this time.

## 2021-09-15 NOTE — HISTORY OF PRESENT ILLNESS
[FreeTextEntry1] : \par Cardiologist: Dr. Mix \par \par 66 yo M former  with recurrent palpitations and paroxysmal AFib diagnosed on the cardiac telemetry monitor. He presents for routine follow up. He had palpitations since Spring 2019. Since then he had 3 more episodes of "fluttering" at times associated with near syncope symptoms lasting 15-20 minutes. He was seen by Dr. Mix, he was equipped with an event monitor, which recorded an episode of Paroxysmal AFib with RVR (-160's) on 10/6/2019 at 20:11 pm. He feels better since being on BB. \par \par He notes very brief palpitations maybe once a month lasting a few minutes. Episodes can occur sporadically and at rest. He cannot find any triggers. He has sleep apnea and uses his CPAP every night. He drinks 1-2 cups of coffee and has a couple of drinks every night. He has had a little bit more stress the last few months and notices episodes becoming more frequently

## 2021-09-15 NOTE — PHYSICAL EXAM
[Well Developed] : well developed [Well Nourished] : well nourished [No Acute Distress] : no acute distress [Normal Conjunctiva] : normal conjunctiva [Normal Venous Pressure] : normal venous pressure [Normal S1, S2] : normal S1, S2 [No Murmur] : no murmur [No Rub] : no rub [No Gallop] : no gallop [Clear Lung Fields] : clear lung fields [Good Air Entry] : good air entry [No Respiratory Distress] : no respiratory distress  [Soft] : abdomen soft [No Edema] : no edema [Normal Gait] : normal gait [No Cyanosis] : no cyanosis [No Clubbing] : no clubbing [No Rash] : no rash [Moves all extremities] : moves all extremities [No Focal Deficits] : no focal deficits [Normal Speech] : normal speech [Alert and Oriented] : alert and oriented [Normal memory] : normal memory

## 2021-12-28 ENCOUNTER — APPOINTMENT (OUTPATIENT)
Dept: CARDIOLOGY | Facility: CLINIC | Age: 66
End: 2021-12-28

## 2022-03-13 ENCOUNTER — RX RENEWAL (OUTPATIENT)
Age: 67
End: 2022-03-13

## 2022-03-23 ENCOUNTER — APPOINTMENT (OUTPATIENT)
Dept: CARDIOLOGY | Facility: CLINIC | Age: 67
End: 2022-03-23
Payer: MEDICARE

## 2022-03-23 VITALS
SYSTOLIC BLOOD PRESSURE: 110 MMHG | DIASTOLIC BLOOD PRESSURE: 84 MMHG | HEART RATE: 66 BPM | WEIGHT: 185 LBS | BODY MASS INDEX: 25.9 KG/M2 | HEIGHT: 71 IN

## 2022-03-23 DIAGNOSIS — N48.6 INDURATION PENIS PLASTICA: ICD-10-CM

## 2022-03-23 PROCEDURE — 99214 OFFICE O/P EST MOD 30 MIN: CPT

## 2022-03-23 PROCEDURE — 93000 ELECTROCARDIOGRAM COMPLETE: CPT

## 2022-03-23 NOTE — HISTORY OF PRESENT ILLNESS
[FreeTextEntry1] : The patient continues to feels well. No chest pain or SOB using his CPAP machine and is feeling well. The patient has not had chest pain or SOB . Seen by EP

## 2022-03-23 NOTE — ASSESSMENT
[FreeTextEntry1] : The patent has had no further AF clinically . He is CHADSvasc 1 and is being treated with ASA . No chest pain or SOB . No syncope or presyncope .  The patient has had 2 CAC scores done . In 2008 he had a CAC score of 0 . In 2014 his CAC score was about 8 . LDL is at goal

## 2022-04-21 NOTE — REVIEW OF SYSTEMS
[Eyeglasses] : currently wearing eyeglasses [Palpitations] : palpitations [Heartburn] : heartburn [Joint Pain] : joint pain [Joint Swelling] : joint swelling [Negative] : Heme/Lymph [Shortness Of Breath] : no shortness of breath [Chest Pain] : no chest pain [Abdominal Pain] : no abdominal pain [Nausea] : no nausea [Dysphagia] : no dysphagia Xeljanz Counseling: I discussed with the patient the risks of Xeljanz therapy including increased risk of infection, liver issues, headache, diarrhea, or cold symptoms. Live vaccines should be avoided. They were instructed to call if they have any problems.

## 2022-05-02 ENCOUNTER — RX RENEWAL (OUTPATIENT)
Age: 67
End: 2022-05-02

## 2022-06-15 ENCOUNTER — APPOINTMENT (OUTPATIENT)
Dept: CARDIOLOGY | Facility: CLINIC | Age: 67
End: 2022-06-15
Payer: MEDICARE

## 2022-06-15 VITALS
HEART RATE: 56 BPM | DIASTOLIC BLOOD PRESSURE: 60 MMHG | TEMPERATURE: 98 F | SYSTOLIC BLOOD PRESSURE: 120 MMHG | BODY MASS INDEX: 25.9 KG/M2 | HEIGHT: 71 IN | WEIGHT: 185 LBS | RESPIRATION RATE: 16 BRPM

## 2022-06-15 PROCEDURE — 93000 ELECTROCARDIOGRAM COMPLETE: CPT

## 2022-06-15 PROCEDURE — 99214 OFFICE O/P EST MOD 30 MIN: CPT

## 2022-06-15 NOTE — ASSESSMENT
[FreeTextEntry1] : # Paroxysmal AFib\par - Infrequent symptoms (occur once every few months and only last a few seconds). \par - Discussed option of continuing Aspirin vs starting DOAC since pt had CT chest that showed Ca score of 8 and therefore has some evidence of CAD.  Patient states he rides his [place] and prefers to stay on Asprin since episodes are infrequent and short. Labs from Jan reviewed. Hg and Cr stable. \par - Cont Metoprolol Succinate 25 mg. \par - Again revisited ablation but pt would like to defer at this time. \par \par # MARZENA\par - Compliant with CPAP \par \par Follow up 3-6 mo\par \par I have also advised the patient to go to the nearest emergency room if he experiences any chest pain, dyspnea, syncope, or has any other compelling symptoms.

## 2022-06-15 NOTE — PHYSICAL EXAM
[Well Developed] : well developed [Well Nourished] : well nourished [No Acute Distress] : no acute distress [Normal Conjunctiva] : normal conjunctiva [Normal Venous Pressure] : normal venous pressure [Normal S1, S2] : normal S1, S2 [No Murmur] : no murmur [No Rub] : no rub [No Gallop] : no gallop [Clear Lung Fields] : clear lung fields [Good Air Entry] : good air entry [No Respiratory Distress] : no respiratory distress  [Soft] : abdomen soft [Normal Gait] : normal gait [No Edema] : no edema [No Rash] : no rash [Moves all extremities] : moves all extremities [Normal Speech] : normal speech [Alert and Oriented] : alert and oriented [Normal memory] : normal memory

## 2022-06-15 NOTE — HISTORY OF PRESENT ILLNESS
[FreeTextEntry1] : \par Cardiologist: Dr. Mix \[facility] \par 67 yo M former  with recurrent palpitations and paroxysmal AFib diagnosed on cardiac telemetry monitor. He had palpitations since Spring 2019. Since then he had 3 more episodes of "fluttering" at times associated with near syncope symptoms lasting 15-20 minutes. He was seen by Dr. Mix and had event monitor that showed paroxysmal AFib with RVR (-160's) on 10/6/2019 at 20:11 pm. He feels better since being on BB. \par \par He presents for routine follow up. Has episodes that last a few seconds (not even long enough for him to catch on excentos). They  occur once every few months. No triggers. Uses CPAP every night for MARZENA and feels better rested. Drinks 1-2 cups of coffee and a couple of drinks every night. Denies chest pain, dyspnea, dizziness, lightheadedness, presyncope and syncope.

## 2022-06-15 NOTE — CARDIOLOGY SUMMARY
[___] : [unfilled] [de-identified] : 6/15/22 Sinus bradycardia (HR 56 bpm)\par 9/15/21 NSR (HR 60 bpm) [de-identified] : Scheduled for Sept \par Stress ECHO 11/6/19, no Ischemia, no exercise induced arrhythmias, Luciano, 7:31, 89% MPHR; EF 55-65%.

## 2022-06-30 ENCOUNTER — APPOINTMENT (OUTPATIENT)
Dept: CARDIOLOGY | Facility: CLINIC | Age: 67
End: 2022-06-30

## 2022-08-31 ENCOUNTER — APPOINTMENT (OUTPATIENT)
Dept: CARDIOLOGY | Facility: CLINIC | Age: 67
End: 2022-08-31

## 2022-08-31 PROCEDURE — 93351 STRESS TTE COMPLETE: CPT

## 2022-08-31 PROCEDURE — 93325 DOPPLER ECHO COLOR FLOW MAPG: CPT

## 2022-08-31 PROCEDURE — 93320 DOPPLER ECHO COMPLETE: CPT

## 2022-09-20 ENCOUNTER — APPOINTMENT (OUTPATIENT)
Dept: CARDIOLOGY | Facility: CLINIC | Age: 67
End: 2022-09-20

## 2022-09-20 VITALS
BODY MASS INDEX: 26.46 KG/M2 | HEIGHT: 71 IN | DIASTOLIC BLOOD PRESSURE: 80 MMHG | SYSTOLIC BLOOD PRESSURE: 130 MMHG | TEMPERATURE: 96 F | WEIGHT: 189 LBS | HEART RATE: 61 BPM

## 2022-09-20 PROCEDURE — 93000 ELECTROCARDIOGRAM COMPLETE: CPT

## 2022-09-20 PROCEDURE — 99214 OFFICE O/P EST MOD 30 MIN: CPT | Mod: 25

## 2022-09-20 NOTE — CARDIOLOGY SUMMARY
[___] : [unfilled] [de-identified] : NSR incomplete RBBB 7- \par 9- NSR incomplete RBBB  [de-identified] : 8- ETT Echo completed 7 minutes and 30 sec No ischemia Trace MR Trace TR . Ascending aorta and Sinus of Valsalva is 3.7 cm

## 2022-09-20 NOTE — ASSESSMENT
[FreeTextEntry1] : The patent has had no further AF clinically . He is CHADSvasc 1 and is being treated with ASA . No chest pain or SOB . No syncope or presyncope .  The patient has had 2 CAC scores done . In 2008 he had a CAC score of 0 . In 2014 his CAC score was about 8 . LDL is at goal The patient has been feeling well overall. He was seen by EP . The patient has been told of the need for RF ablattion . He is being treated for MARZENA and since that tiime his AF has improved

## 2022-09-20 NOTE — HISTORY OF PRESENT ILLNESS
[FreeTextEntry1] : The patient continues to feels well. No chest pain or SOB using his CPAP machine and is feeling well. The patient has not had chest pain or SOB . Seen by EP . The patietn had covid during the summer  ETT Echo was negative for ischemia at moderate exercise load .

## 2022-10-12 ENCOUNTER — APPOINTMENT (OUTPATIENT)
Dept: CARDIOLOGY | Facility: CLINIC | Age: 67
End: 2022-10-12

## 2022-10-12 VITALS
HEART RATE: 63 BPM | DIASTOLIC BLOOD PRESSURE: 77 MMHG | BODY MASS INDEX: 25.62 KG/M2 | SYSTOLIC BLOOD PRESSURE: 132 MMHG | TEMPERATURE: 97.2 F | WEIGHT: 183 LBS | HEIGHT: 71 IN

## 2022-10-12 PROCEDURE — 93000 ELECTROCARDIOGRAM COMPLETE: CPT

## 2022-10-12 PROCEDURE — 99215 OFFICE O/P EST HI 40 MIN: CPT | Mod: 25

## 2022-10-12 NOTE — ASSESSMENT
[FreeTextEntry1] : # Paroxysmal AFib\par - Infrequent symptoms (occur once every few months and only last a few seconds to maybe a min). \par - Informed patient that CHADS VASc is 2 (Age > 65 and Ca score of 8 on chest CT). Patient states he rides his [place] and prefers to stay on Aspirin since episodes are infrequent and short. Labs from Sept reviewed. Hg and Cr stable. \par - Cont Metoprolol Succinate 25 mg daily. \par - Discussed option of ablation and explained procedure in detail. Patient wishes to avoid procedures at this time. \par - Recent stress reviewed. \par - Advised pt to notify us at once or go to the ER if symptoms last longer than a few min. \par \par # MARZENA\par - Compliant with CPAP \par \par Follow up 6 mo\par \par I have also advised the patient to go to the nearest emergency room if he experiences any chest pain, dyspnea, syncope, or has any other compelling symptoms.

## 2022-10-12 NOTE — DISCUSSION/SUMMARY
[FreeTextEntry1] : We had an extensive conversation regarding the nature of atrial fibrillation, including potential etiologies, underlying pathophysiology and natural history of the disease. In addition, the potential risk of thromboembolic events and assessment of that risk were discussed. I have emphasized the importance of continuing anticoagulation. \par \par In addition, the maintenance of sinus rhythm along with adjuvant antiarrhythmic agents and catheter ablation therapy were discussed. The rationale for and risks of ablation therapy were discussed, including but not limited to bleeding, vascular injury, groin complications, cardiac perforation and tamponade, stroke, esophageal injury, pulmonary vein stenosis, need for pacemaker, need for cardiac surgery, and death. In addition, the long-term and ongoing nature of this therapy were also discussed, including the critical role of continued monitoring post-ablation and the potential for the necessity of repeat ablation procedures to definitively treat the condition. \par \par The patient verbalized understanding of the discussion and all questions were addressed and answered. The patient would like to DEFER ablation at this time.  [EKG obtained to assist in diagnosis and management of assessed problem(s)] : EKG obtained to assist in diagnosis and management of assessed problem(s)

## 2022-10-12 NOTE — PHYSICAL EXAM
[Well Developed] : well developed [Well Nourished] : well nourished [No Acute Distress] : no acute distress [Normal Conjunctiva] : normal conjunctiva [Normal Venous Pressure] : normal venous pressure [Normal S1, S2] : normal S1, S2 [No Murmur] : no murmur [Clear Lung Fields] : clear lung fields [Good Air Entry] : good air entry [No Respiratory Distress] : no respiratory distress  [Soft] : abdomen soft [Normal Gait] : normal gait [No Edema] : no edema [No Rash] : no rash [Moves all extremities] : moves all extremities [Normal Speech] : normal speech [Alert and Oriented] : alert and oriented [Normal memory] : normal memory

## 2022-10-12 NOTE — CARDIOLOGY SUMMARY
[___] : [unfilled] [de-identified] : 10/12/2022 NSR (HR 63 bpm), QTc 405 msec\par 6/15/22 Sinus bradycardia (HR 56 bpm)\par 9/15/21 NSR (HR 60 bpm) [de-identified] : Stress ECHO 8/31/22 EF 61% Dilated ascending aorta 3.7 cm, Negative stress echo for ischemia. \par Stress ECHO 11/6/19, no Ischemia, no exercise induced arrhythmias, Luciano, 7:31, 89% MPHR; EF 55-65%.

## 2022-10-12 NOTE — HISTORY OF PRESENT ILLNESS
[FreeTextEntry1] : \par Cardiologist: Dr. Mix \par \par 65 yo M retired  with recurrent palpitations and paroxysmal AFib diagnosed on cardiac telemetry monitor. He had palpitations since Spring 2019. Since then, he had 3 more episodes of "fluttering" at times associated with near syncope; symptoms last 15-20 min. He was seen by Dr. Mix and event monitor showed paroxysmal AFib with RVR (-160's) on 10/6/2019 at 20:11 pm. He feels better since being on BB. \par \par He presents for routine follow up. He has had maybe two episodes since his last visit and they last anywhere from a few seconds to a min or two. He gets associated lightheadedness. Episodes are not long enough for him to catch on dooub. Uses CPAP every night for MARZENA and feels better rested. Drinks 1-2 cups of coffee and a couple of drinks every night. Denies chest pain, dyspnea, dizziness, lightheadedness, presyncope and syncope. He had COVID in July (no hospitalization).

## 2022-12-04 ENCOUNTER — RX RENEWAL (OUTPATIENT)
Age: 67
End: 2022-12-04

## 2023-02-19 ENCOUNTER — NON-APPOINTMENT (OUTPATIENT)
Age: 68
End: 2023-02-19

## 2023-02-23 ENCOUNTER — NON-APPOINTMENT (OUTPATIENT)
Age: 68
End: 2023-02-23

## 2023-03-21 ENCOUNTER — APPOINTMENT (OUTPATIENT)
Dept: CARDIOLOGY | Facility: CLINIC | Age: 68
End: 2023-03-21
Payer: MEDICARE

## 2023-03-21 VITALS
HEIGHT: 71 IN | WEIGHT: 187 LBS | DIASTOLIC BLOOD PRESSURE: 80 MMHG | TEMPERATURE: 97 F | SYSTOLIC BLOOD PRESSURE: 110 MMHG | HEART RATE: 62 BPM | BODY MASS INDEX: 26.18 KG/M2

## 2023-03-21 DIAGNOSIS — N50.819 TESTICULAR PAIN, UNSPECIFIED: ICD-10-CM

## 2023-03-21 PROCEDURE — 93000 ELECTROCARDIOGRAM COMPLETE: CPT

## 2023-03-21 PROCEDURE — 99214 OFFICE O/P EST MOD 30 MIN: CPT | Mod: 25

## 2023-03-21 NOTE — CARDIOLOGY SUMMARY
[___] : [unfilled] [de-identified] : NSR incomplete RBBB 7- \par 9- NSR incomplete RBBB \par 3- NSR PVC's  [de-identified] : 8- ETT Echo completed 7 minutes and 30 sec No ischemia Trace MR Trace TR . Ascending aorta and Sinus of Valsalva is 3.7 cm

## 2023-03-21 NOTE — HISTORY OF PRESENT ILLNESS
[FreeTextEntry1] : The patient had palpitations in December which has resolved after decreasing his Caffeine . The patient has not had chest painor SOB . ETT echo was negative for ischemia in august .

## 2023-03-21 NOTE — ASSESSMENT
[FreeTextEntry1] : The patient has has had palpitations in december . The patient has PVC's and feels extrasystolies/palpitations. The Porlonged episodes seemed to have improved with decreasing caffeine . He is CHADSvasc 1-2 ( 2 if a CAC score of 8 ) , The patient risdes a motorcycle as well and the burden was not high in the past . . ETT echo was negative for ischemia .

## 2023-05-24 ENCOUNTER — APPOINTMENT (OUTPATIENT)
Dept: ELECTROPHYSIOLOGY | Facility: CLINIC | Age: 68
End: 2023-05-24
Payer: MEDICARE

## 2023-05-24 VITALS
HEART RATE: 62 BPM | TEMPERATURE: 97.1 F | WEIGHT: 185 LBS | DIASTOLIC BLOOD PRESSURE: 82 MMHG | RESPIRATION RATE: 16 BRPM | SYSTOLIC BLOOD PRESSURE: 122 MMHG | HEIGHT: 71 IN | BODY MASS INDEX: 25.9 KG/M2

## 2023-05-24 PROCEDURE — 99215 OFFICE O/P EST HI 40 MIN: CPT | Mod: 25

## 2023-05-24 PROCEDURE — 93000 ELECTROCARDIOGRAM COMPLETE: CPT

## 2023-05-24 NOTE — ASSESSMENT
[FreeTextEntry1] : # Paroxysmal AFib with RVR / Palpitations\par - No episodes since Dec since stopping coffee. Currently wearing a monitor. \par - Discussed with pt that his CHADS VASc is 2 (Age > 65 and Ca score of 8 on chest CT). Patient states he rides his [place] and prefers to stay on Aspirin since episodes are infrequent and short. Labs from March reviewed. Hg and Cr stable. \par - Cont Metoprolol Succinate 25 mg daily. \par - Discussed option of ablation and explained procedure in detail. Patient wishes to avoid procedures at this time. \par \par # MARZENA\par - Compliant with CPAP \par \par I have also advised the patient to go to the nearest emergency room if he experiences any chest pain, dyspnea, syncope, or has any other compelling symptoms.\par \par Follow up 6 mo\par

## 2023-05-24 NOTE — DISCUSSION/SUMMARY
[EKG obtained to assist in diagnosis and management of assessed problem(s)] : EKG obtained to assist in diagnosis and management of assessed problem(s) [FreeTextEntry1] : We had an extensive conversation regarding the nature of atrial fibrillation, including potential etiologies, underlying pathophysiology and natural history of the disease. In addition, the potential risk of thromboembolic events and assessment of that risk were discussed. I have emphasized the importance of continuing anticoagulation. \par \par In addition, the maintenance of sinus rhythm along with adjuvant antiarrhythmic agents and catheter ablation therapy were discussed. The rationale for and risks of ablation therapy were discussed, including but not limited to bleeding, vascular injury, groin complications, cardiac perforation and tamponade, stroke, esophageal injury, pulmonary vein stenosis, need for pacemaker, need for cardiac surgery, and death. In addition, the long-term and ongoing nature of this therapy were also discussed, including the critical role of continued monitoring post-ablation and the potential for the necessity of repeat ablation procedures to definitively treat the condition. \par \par The patient verbalized understanding of the discussion and all questions were addressed and answered. The patient would like to DEFER ablation at this time.

## 2023-05-24 NOTE — CARDIOLOGY SUMMARY
[___] : [unfilled] [de-identified] : 5/24/2023 NSR (HR 63 bpm)\par 10/12/2022 NSR (HR 63 bpm), QTc 405 msec\par 6/15/22 Sinus bradycardia (HR 56 bpm)\par 9/15/21 NSR (HR 60 bpm) [de-identified] : Stress ECHO 8/31/22 EF 61% Dilated ascending aorta 3.7 cm, Negative stress echo for ischemia. \par Stress ECHO 11/6/19, no Ischemia, no exercise induced arrhythmias, Luciano, 7:31, 89% MPHR; EF 55-65%.

## 2023-05-24 NOTE — HISTORY OF PRESENT ILLNESS
[FreeTextEntry1] : \[facility] Cardiologist: Dr. Mix \par \par 66 yo M retired  with recurrent palpitations and paroxysmal AFib diagnosed on cardiac telemetry monitor. He had palpitations since Spring 2019. Since then, he had 3 more episodes of "fluttering" at times associated with near syncope; symptoms last 15-20 min. He was seen by Dr. Mix and event monitor showed paroxysmal AFib with RVR (-160's) on 10/6/2019 at 20:11 pm. He feels better since being on BB. \par \par He has had maybe two episodes since his last visit and they last anywhere from a few seconds to a min or two. He gets associated lightheadedness. Episodes are not long enough for him to catch on Movetis. He had COVID in July (no hospitalization). Episodes becoming more frequent.\[facility] \[facility] 5/24 Here for routine follow up. Had two more episodes since his last visit (one in Nov and one in Dec). One lasted all day. He stopped drinking caffeine and has noticed no more AF. He was only drinking 1-2 cups of coffee a day. He uses CPAP nightly. Denies chest pain, dyspnea, dizziness, lightheadedness, presyncope and syncope.  Gardens and feels well. Was given a repeat monitor from his cardio, which he is currently wearing.

## 2023-06-04 ENCOUNTER — RX RENEWAL (OUTPATIENT)
Age: 68
End: 2023-06-04

## 2023-06-21 ENCOUNTER — NON-APPOINTMENT (OUTPATIENT)
Age: 68
End: 2023-06-21

## 2023-07-02 ENCOUNTER — RX RENEWAL (OUTPATIENT)
Age: 68
End: 2023-07-02

## 2023-07-25 ENCOUNTER — RESULT CHARGE (OUTPATIENT)
Age: 68
End: 2023-07-25

## 2023-07-25 ENCOUNTER — APPOINTMENT (OUTPATIENT)
Dept: CARDIOLOGY | Facility: CLINIC | Age: 68
End: 2023-07-25
Payer: MEDICARE

## 2023-07-25 VITALS
HEART RATE: 66 BPM | HEIGHT: 71 IN | SYSTOLIC BLOOD PRESSURE: 118 MMHG | DIASTOLIC BLOOD PRESSURE: 80 MMHG | BODY MASS INDEX: 26.32 KG/M2 | WEIGHT: 188 LBS

## 2023-07-25 DIAGNOSIS — K20.90 BARRETT'S ESOPHAGUS W/OUT DYSPLASIA: ICD-10-CM

## 2023-07-25 DIAGNOSIS — K22.70 BARRETT'S ESOPHAGUS W/OUT DYSPLASIA: ICD-10-CM

## 2023-07-25 DIAGNOSIS — R55 DIZZINESS AND GIDDINESS: ICD-10-CM

## 2023-07-25 DIAGNOSIS — E78.5 HYPERLIPIDEMIA, UNSPECIFIED: ICD-10-CM

## 2023-07-25 DIAGNOSIS — R42 DIZZINESS AND GIDDINESS: ICD-10-CM

## 2023-07-25 PROCEDURE — 99214 OFFICE O/P EST MOD 30 MIN: CPT | Mod: 25

## 2023-07-25 PROCEDURE — 93000 ELECTROCARDIOGRAM COMPLETE: CPT

## 2023-07-25 NOTE — CARDIOLOGY SUMMARY
[___] : [unfilled] [de-identified] : NSR incomplete RBBB 7- \par 9- NSR incomplete RBBB \par 3- NSR PVC's\par 7- NSR incomplete RBBB   [de-identified] : 6-2023 RANDOLPH no arrhythmias  [de-identified] : 8- ETT Echo completed 7 minutes and 30 sec No ischemia Trace MR Trace TR . Ascending aorta and Sinus of Valsalva is 3.7 cm

## 2023-07-25 NOTE — PHYSICAL EXAM
[General Appearance - Well Developed] : well developed [Normal Appearance] : normal appearance [Well Groomed] : well groomed [General Appearance - Well Nourished] : well nourished [General Appearance - In No Acute Distress] : no acute distress [Normal Conjunctiva] : the conjunctiva exhibited no abnormalities [Eyelids - No Xanthelasma] : the eyelids demonstrated no xanthelasmas [Normal Oral Mucosa] : normal oral mucosa [No Oral Pallor] : no oral pallor [No Oral Cyanosis] : no oral cyanosis [Respiration, Rhythm And Depth] : normal respiratory rhythm and effort [Exaggerated Use Of Accessory Muscles For Inspiration] : no accessory muscle use [Abdomen Mass (___ Cm)] : no abdominal mass palpated [Abnormal Walk] : normal gait [Nail Clubbing] : no clubbing of the fingernails [Cyanosis, Localized] : no localized cyanosis [Petechial Hemorrhages (___cm)] : no petechial hemorrhages [Skin Color & Pigmentation] : normal skin color and pigmentation [] : no rash [No Venous Stasis] : no venous stasis [No Skin Ulcers] : no skin ulcer [Skin Lesions] : no skin lesions [No Xanthoma] : no  xanthoma was observed [Oriented To Time, Place, And Person] : oriented to person, place, and time [Affect] : the affect was normal [Mood] : the mood was normal [No Anxiety] : not feeling anxious [No Murmur] : no murmurs heard [2+] : left 2+ [No Pitting Edema] : no pitting edema present [Rt] : varicose veins of the right leg noted [Lt] : varicose veins of the left leg noted [FreeTextEntry1] : No JVD

## 2023-07-25 NOTE — ASSESSMENT
[FreeTextEntry1] : The patient has has had palpitations in December . The patient has PVC's and feels extrasystoles/palpitations. The Porlonged episodes seemed to have improved with decreasing caffeine . He is CHADSvasc 1-2 ( 2 if a CAC score of 8 ) , The patient rides a motorcycle as well and the burden was not high in the past . . ETT echo was negative for ischemia . The patient has MARZENA and is using his CPAP . MCOT showed no arrhythmias in fact no extrasystoles

## 2023-07-25 NOTE — HISTORY OF PRESENT ILLNESS
[FreeTextEntry1] : The patient had no further palpitations . Last episode of AF was in  . MCOT showed no arrhythmias No AF . Seen by EP .

## 2023-10-11 ENCOUNTER — APPOINTMENT (OUTPATIENT)
Dept: ELECTROPHYSIOLOGY | Facility: CLINIC | Age: 68
End: 2023-10-11
Payer: MEDICARE

## 2023-10-11 VITALS
HEIGHT: 71 IN | BODY MASS INDEX: 25.2 KG/M2 | DIASTOLIC BLOOD PRESSURE: 76 MMHG | WEIGHT: 180 LBS | HEART RATE: 62 BPM | TEMPERATURE: 98 F | SYSTOLIC BLOOD PRESSURE: 120 MMHG

## 2023-10-11 PROCEDURE — 93000 ELECTROCARDIOGRAM COMPLETE: CPT

## 2023-10-11 PROCEDURE — 99215 OFFICE O/P EST HI 40 MIN: CPT | Mod: 25

## 2023-10-11 RX ORDER — OMEPRAZOLE 20 MG/1
20 CAPSULE, DELAYED RELEASE ORAL
Refills: 0 | Status: ACTIVE | COMMUNITY

## 2023-10-11 RX ORDER — ASPIRIN 81 MG/1
81 TABLET, COATED ORAL
Qty: 90 | Refills: 3 | Status: ACTIVE | COMMUNITY
Start: 2020-09-29

## 2023-10-11 RX ORDER — ATORVASTATIN CALCIUM 40 MG/1
40 TABLET, FILM COATED ORAL DAILY
Qty: 90 | Refills: 3 | Status: ACTIVE | COMMUNITY
Start: 2023-07-02

## 2023-10-11 RX ORDER — FAMOTIDINE 40 MG/1
40 TABLET, FILM COATED ORAL
Refills: 0 | Status: ACTIVE | COMMUNITY

## 2023-11-26 ENCOUNTER — RX RENEWAL (OUTPATIENT)
Age: 68
End: 2023-11-26

## 2024-02-20 ENCOUNTER — APPOINTMENT (OUTPATIENT)
Dept: CARDIOLOGY | Facility: CLINIC | Age: 69
End: 2024-02-20
Payer: MEDICARE

## 2024-02-20 ENCOUNTER — RESULT CHARGE (OUTPATIENT)
Age: 69
End: 2024-02-20

## 2024-02-20 VITALS
HEIGHT: 71 IN | WEIGHT: 186 LBS | HEART RATE: 66 BPM | SYSTOLIC BLOOD PRESSURE: 120 MMHG | BODY MASS INDEX: 26.04 KG/M2 | DIASTOLIC BLOOD PRESSURE: 80 MMHG

## 2024-02-20 DIAGNOSIS — G47.33 OBSTRUCTIVE SLEEP APNEA (ADULT) (PEDIATRIC): ICD-10-CM

## 2024-02-20 DIAGNOSIS — I48.0 PAROXYSMAL ATRIAL FIBRILLATION: ICD-10-CM

## 2024-02-20 DIAGNOSIS — R73.03 PREDIABETES.: ICD-10-CM

## 2024-02-20 DIAGNOSIS — I25.10 ATHEROSCLEROTIC HEART DISEASE OF NATIVE CORONARY ARTERY W/OUT ANGINA PECTORIS: ICD-10-CM

## 2024-02-20 DIAGNOSIS — R00.2 PALPITATIONS: ICD-10-CM

## 2024-02-20 PROCEDURE — 99214 OFFICE O/P EST MOD 30 MIN: CPT | Mod: 25

## 2024-02-20 PROCEDURE — 93000 ELECTROCARDIOGRAM COMPLETE: CPT

## 2024-02-20 RX ORDER — METOPROLOL SUCCINATE 25 MG/1
25 TABLET, EXTENDED RELEASE ORAL
Qty: 90 | Refills: 3 | Status: ACTIVE | COMMUNITY
Start: 2019-10-09 | End: 1900-01-01

## 2024-02-20 NOTE — CARDIOLOGY SUMMARY
[de-identified] : NSR incomplete RBBB 7- \par  9- NSR incomplete RBBB \par  3- NSR PVC's\par  7- NSR incomplete RBBB   [de-identified] : 6-2023 RANDOLPH no arrhythmias  [de-identified] : 8- ETT Echo completed 7 minutes and 30 sec No ischemia Trace MR Trace TR . Ascending aorta and Sinus of Valsalva is 3.7 cm  [___] : [unfilled]

## 2024-02-20 NOTE — REASON FOR VISIT
[CV Risk Factors and Non-Cardiac Disease] : CV risk factors and non-cardiac disease [Arrhythmia/ECG Abnorrmalities] : arrhythmia/ECG abnormalities [Hyperlipidemia] : hyperlipidemia [FreeTextEntry3] : Dr. Aaron  [Consultation] : a consultation regarding [Palpitations] : palpitations [FreeTextEntry1] : presyncope

## 2024-02-20 NOTE — PHYSICAL EXAM
[General Appearance - Well Developed] : well developed [Normal Appearance] : normal appearance [Well Groomed] : well groomed [General Appearance - Well Nourished] : well nourished [General Appearance - In No Acute Distress] : no acute distress [Normal Conjunctiva] : the conjunctiva exhibited no abnormalities [Eyelids - No Xanthelasma] : the eyelids demonstrated no xanthelasmas [Normal Oral Mucosa] : normal oral mucosa [No Oral Pallor] : no oral pallor [No Oral Cyanosis] : no oral cyanosis [FreeTextEntry1] : No JVD  [Respiration, Rhythm And Depth] : normal respiratory rhythm and effort [Exaggerated Use Of Accessory Muscles For Inspiration] : no accessory muscle use [Abdomen Mass (___ Cm)] : no abdominal mass palpated [Abnormal Walk] : normal gait [Nail Clubbing] : no clubbing of the fingernails [Cyanosis, Localized] : no localized cyanosis [Petechial Hemorrhages (___cm)] : no petechial hemorrhages [Skin Color & Pigmentation] : normal skin color and pigmentation [] : no rash [No Venous Stasis] : no venous stasis [Skin Lesions] : no skin lesions [No Skin Ulcers] : no skin ulcer [No Xanthoma] : no  xanthoma was observed [Oriented To Time, Place, And Person] : oriented to person, place, and time [Affect] : the affect was normal [Mood] : the mood was normal [No Anxiety] : not feeling anxious [No Murmur] : no murmurs heard [2+] : left 2+ [No Pitting Edema] : no pitting edema present [Rt] : varicose veins of the right leg noted [Lt] : varicose veins of the left leg noted

## 2024-02-20 NOTE — ASSESSMENT
[FreeTextEntry1] : The patient has has had palpitations in December . The patient has PVC's and feels extrasystoles/palpitations. The Porlonged episodes seemed to have improved with decreasing caffeine . He is CHADSvasc 1-2 ( 2 if a CAC score of 8 ) , The patient rides a motorcycle as well and the burden was not high in the past . . ETT echo was negative for ischemia . The patient has MARZENA and is using his CPAP . MCOT showed no arrhythmias in fact no extrasystoles The patient has had no chest pain . LDL is at goal and BP is acceptable.

## 2024-06-27 ENCOUNTER — NON-APPOINTMENT (OUTPATIENT)
Age: 69
End: 2024-06-27

## 2024-07-10 ENCOUNTER — APPOINTMENT (OUTPATIENT)
Dept: ELECTROPHYSIOLOGY | Facility: CLINIC | Age: 69
End: 2024-07-10
Payer: MEDICARE

## 2024-07-10 VITALS
SYSTOLIC BLOOD PRESSURE: 120 MMHG | WEIGHT: 185 LBS | BODY MASS INDEX: 25.9 KG/M2 | HEART RATE: 57 BPM | DIASTOLIC BLOOD PRESSURE: 82 MMHG | HEIGHT: 71 IN | TEMPERATURE: 97.1 F

## 2024-07-10 DIAGNOSIS — G47.33 OBSTRUCTIVE SLEEP APNEA (ADULT) (PEDIATRIC): ICD-10-CM

## 2024-07-10 PROCEDURE — G2211 COMPLEX E/M VISIT ADD ON: CPT

## 2024-07-10 PROCEDURE — 99215 OFFICE O/P EST HI 40 MIN: CPT

## 2024-07-10 PROCEDURE — 93000 ELECTROCARDIOGRAM COMPLETE: CPT

## 2024-07-16 ENCOUNTER — APPOINTMENT (OUTPATIENT)
Dept: CARDIOLOGY | Facility: CLINIC | Age: 69
End: 2024-07-16
Payer: MEDICARE

## 2024-07-16 DIAGNOSIS — I48.0 PAROXYSMAL ATRIAL FIBRILLATION: ICD-10-CM

## 2024-07-16 DIAGNOSIS — R00.2 PALPITATIONS: ICD-10-CM

## 2024-07-16 DIAGNOSIS — I25.10 ATHEROSCLEROTIC HEART DISEASE OF NATIVE CORONARY ARTERY W/OUT ANGINA PECTORIS: ICD-10-CM

## 2024-07-16 PROCEDURE — 93320 DOPPLER ECHO COMPLETE: CPT

## 2024-07-16 PROCEDURE — 93325 DOPPLER ECHO COLOR FLOW MAPG: CPT

## 2024-07-16 PROCEDURE — 93351 STRESS TTE COMPLETE: CPT

## 2024-08-06 ENCOUNTER — APPOINTMENT (OUTPATIENT)
Dept: CARDIOLOGY | Facility: CLINIC | Age: 69
End: 2024-08-06

## 2024-08-06 ENCOUNTER — RESULT CHARGE (OUTPATIENT)
Age: 69
End: 2024-08-06

## 2024-08-06 ENCOUNTER — NON-APPOINTMENT (OUTPATIENT)
Age: 69
End: 2024-08-06

## 2024-08-06 PROCEDURE — 99214 OFFICE O/P EST MOD 30 MIN: CPT | Mod: 25

## 2024-08-06 PROCEDURE — 93000 ELECTROCARDIOGRAM COMPLETE: CPT

## 2024-08-06 NOTE — CARDIOLOGY SUMMARY
[___] : [unfilled] [de-identified] : NSR incomplete RBBB 7- 9- NSR incomplete RBBB  3- NSR PVC's 8-6-2024 NSR LAD  7- NSR incomplete RBBB   [de-identified] : 6-2023 RANDOLPH no arrhythmias  [de-identified] : 8- ETT Echo completed 7 minutes and 30 sec No ischemia Trace MR Trace TR . Ascending aorta and Sinus of Valsalva is 3.7 cm 7- ETT Echo completed 7 minutes and 11 seconds no ischemia . LAF size normal . Trace MR and TR

## 2024-08-06 NOTE — ASSESSMENT
[FreeTextEntry1] : The patient has not had chest pain or SOB . ETT Echo was negative for ischemia at moderate exercise load . The patient has PAF and CHADSvasc 1-2 . Seen by EP . It was decided not to A/C in view of risks . He has not had documented recurrence and he has not had palpitations . LDL is acc[eptable  BP is stable.

## 2024-08-06 NOTE — HISTORY OF PRESENT ILLNESS
[FreeTextEntry1] : The patient has PAF . He has not had recent episodes . the episodes are brief . He was seen by EP and it was discussed possible full A/C . It was decided that he rides a motorcycle and his episodes were brief . It was decided not to A/C at that time .

## 2024-08-19 ENCOUNTER — RX RENEWAL (OUTPATIENT)
Age: 69
End: 2024-08-19

## 2024-12-25 PROBLEM — F10.90 ALCOHOL USE: Status: ACTIVE | Noted: 2019-10-09

## 2025-02-11 ENCOUNTER — APPOINTMENT (OUTPATIENT)
Dept: CARDIOLOGY | Facility: CLINIC | Age: 70
End: 2025-02-11
Payer: MEDICARE

## 2025-02-11 ENCOUNTER — NON-APPOINTMENT (OUTPATIENT)
Age: 70
End: 2025-02-11

## 2025-02-11 VITALS
BODY MASS INDEX: 26.6 KG/M2 | HEART RATE: 61 BPM | SYSTOLIC BLOOD PRESSURE: 128 MMHG | DIASTOLIC BLOOD PRESSURE: 80 MMHG | HEIGHT: 71 IN | WEIGHT: 190 LBS

## 2025-02-11 DIAGNOSIS — I25.10 ATHEROSCLEROTIC HEART DISEASE OF NATIVE CORONARY ARTERY W/OUT ANGINA PECTORIS: ICD-10-CM

## 2025-02-11 PROCEDURE — 99214 OFFICE O/P EST MOD 30 MIN: CPT | Mod: 25

## 2025-02-11 PROCEDURE — 93000 ELECTROCARDIOGRAM COMPLETE: CPT

## 2025-02-25 ENCOUNTER — OUTPATIENT (OUTPATIENT)
Dept: OUTPATIENT SERVICES | Facility: HOSPITAL | Age: 70
LOS: 1 days | End: 2025-02-25
Payer: SELF-PAY

## 2025-02-25 ENCOUNTER — RESULT REVIEW (OUTPATIENT)
Age: 70
End: 2025-02-25

## 2025-02-25 DIAGNOSIS — Z00.8 ENCOUNTER FOR OTHER GENERAL EXAMINATION: ICD-10-CM

## 2025-02-25 DIAGNOSIS — I25.10 ATHEROSCLEROTIC HEART DISEASE OF NATIVE CORONARY ARTERY WITHOUT ANGINA PECTORIS: ICD-10-CM

## 2025-02-25 PROCEDURE — 75571 CT HRT W/O DYE W/CA TEST: CPT | Mod: 26

## 2025-02-25 PROCEDURE — 75571 CT HRT W/O DYE W/CA TEST: CPT

## 2025-02-26 DIAGNOSIS — I25.10 ATHEROSCLEROTIC HEART DISEASE OF NATIVE CORONARY ARTERY WITHOUT ANGINA PECTORIS: ICD-10-CM

## 2025-04-09 ENCOUNTER — APPOINTMENT (OUTPATIENT)
Dept: ELECTROPHYSIOLOGY | Facility: CLINIC | Age: 70
End: 2025-04-09

## 2025-08-12 ENCOUNTER — APPOINTMENT (OUTPATIENT)
Dept: CARDIOLOGY | Facility: CLINIC | Age: 70
End: 2025-08-12
Payer: MEDICARE

## 2025-08-12 VITALS
HEART RATE: 60 BPM | WEIGHT: 190 LBS | BODY MASS INDEX: 26.6 KG/M2 | SYSTOLIC BLOOD PRESSURE: 126 MMHG | DIASTOLIC BLOOD PRESSURE: 80 MMHG | HEIGHT: 71 IN

## 2025-08-12 DIAGNOSIS — I48.0 PAROXYSMAL ATRIAL FIBRILLATION: ICD-10-CM

## 2025-08-12 DIAGNOSIS — R42 DIZZINESS AND GIDDINESS: ICD-10-CM

## 2025-08-12 DIAGNOSIS — E78.5 HYPERLIPIDEMIA, UNSPECIFIED: ICD-10-CM

## 2025-08-12 DIAGNOSIS — R73.03 PREDIABETES.: ICD-10-CM

## 2025-08-12 DIAGNOSIS — R55 DIZZINESS AND GIDDINESS: ICD-10-CM

## 2025-08-12 DIAGNOSIS — I25.10 ATHEROSCLEROTIC HEART DISEASE OF NATIVE CORONARY ARTERY W/OUT ANGINA PECTORIS: ICD-10-CM

## 2025-08-12 DIAGNOSIS — N48.6 INDURATION PENIS PLASTICA: ICD-10-CM

## 2025-08-12 DIAGNOSIS — R00.2 PALPITATIONS: ICD-10-CM

## 2025-08-12 DIAGNOSIS — G47.33 OBSTRUCTIVE SLEEP APNEA (ADULT) (PEDIATRIC): ICD-10-CM

## 2025-08-12 PROCEDURE — 93000 ELECTROCARDIOGRAM COMPLETE: CPT

## 2025-08-12 PROCEDURE — 99214 OFFICE O/P EST MOD 30 MIN: CPT | Mod: 25

## 2025-08-12 RX ORDER — EZETIMIBE 10 MG/1
10 TABLET ORAL
Qty: 90 | Refills: 3 | Status: ACTIVE | COMMUNITY
Start: 2025-08-12 | End: 1900-01-01

## 2025-09-12 ENCOUNTER — RESULT REVIEW (OUTPATIENT)
Age: 70
End: 2025-09-12